# Patient Record
Sex: MALE | Race: WHITE | NOT HISPANIC OR LATINO | Employment: STUDENT | URBAN - METROPOLITAN AREA
[De-identification: names, ages, dates, MRNs, and addresses within clinical notes are randomized per-mention and may not be internally consistent; named-entity substitution may affect disease eponyms.]

---
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
## 2025-04-28 ENCOUNTER — OFFICE VISIT (OUTPATIENT)
Dept: URGENT CARE | Facility: CLINIC | Age: 20
End: 2025-04-28
Payer: COMMERCIAL

## 2025-04-28 VITALS
HEIGHT: 75 IN | TEMPERATURE: 98 F | SYSTOLIC BLOOD PRESSURE: 137 MMHG | HEART RATE: 87 BPM | BODY MASS INDEX: 19.38 KG/M2 | WEIGHT: 155.88 LBS | RESPIRATION RATE: 20 BRPM | OXYGEN SATURATION: 98 % | DIASTOLIC BLOOD PRESSURE: 80 MMHG

## 2025-04-28 DIAGNOSIS — L29.9 ITCHING: ICD-10-CM

## 2025-04-28 DIAGNOSIS — R21 RASH: Primary | ICD-10-CM

## 2025-04-28 PROCEDURE — 99203 OFFICE O/P NEW LOW 30 MIN: CPT | Mod: S$GLB,,, | Performed by: FAMILY MEDICINE

## 2025-04-28 RX ORDER — IVERMECTIN 3 MG/1
3 TABLET ORAL DAILY PRN
Qty: 3 TABLET | Refills: 0 | Status: SHIPPED | OUTPATIENT
Start: 2025-04-28

## 2025-04-28 RX ORDER — PERMETHRIN 50 MG/G
CREAM TOPICAL ONCE
Qty: 60 G | Refills: 1 | Status: SHIPPED | OUTPATIENT
Start: 2025-04-28 | End: 2025-04-28

## 2025-04-28 RX ORDER — HYDROXYZINE HYDROCHLORIDE 25 MG/1
25 TABLET, FILM COATED ORAL 3 TIMES DAILY PRN
Qty: 30 TABLET | Refills: 0 | Status: SHIPPED | OUTPATIENT
Start: 2025-04-28 | End: 2025-05-08

## 2025-04-28 NOTE — PROGRESS NOTES
"Subjective:      Patient ID: Santi Stevenson is a 19 y.o. male.    Vitals:  height is 6' 3" (1.905 m) and weight is 70.7 kg (155 lb 13.8 oz). His oral temperature is 98.1 °F (36.7 °C). His blood pressure is 137/80 and his pulse is 87. His respiration is 20 and oxygen saturation is 98%.     Chief Complaint: Rash    Pt reports about 3-4 mos ago he has been having constant generalized itching. He thinks it started after he was staying at a friends house and sleeping on friends couch. He says he will just itch everywhere all night.  He is concerned for scabies.     Rash  This is a new problem. The current episode started in the past 7 days. The problem has been gradually improving since onset. The affected locations include the abdomen, left arm, right arm, right upper leg, right lower leg, left upper leg and left lower leg. The rash is characterized by itchiness and redness. It is unknown if there was an exposure to a precipitant. Pertinent negatives include no fatigue or fever. Past treatments include moisturizer. The treatment provided no relief.       Constitution: Negative for activity change, appetite change, chills, sweating, fatigue and fever.   Skin:  Positive for rash.   Allergic/Immunologic: Positive for itching.      Objective:     Physical Exam   Constitutional: He is oriented to person, place, and time. He appears well-developed.  Non-toxic appearance. He does not appear ill. No distress.   HENT:   Head: Normocephalic and atraumatic.   Ears:   Right Ear: External ear normal.   Left Ear: External ear normal.   Nose: Nose normal.   Mouth/Throat: Oropharynx is clear and moist.   Eyes: Conjunctivae, EOM and lids are normal. Pupils are equal, round, and reactive to light.   Neck: Trachea normal and phonation normal. Neck supple.   Musculoskeletal: Normal range of motion.         General: Normal range of motion.   Neurological: He is alert and oriented to person, place, and time.   Skin: Skin is warm, " dry, intact, not diaphoretic, rash and papular (fine papular rash, in web spaces, flexor aspect of wrists, abd, back, ble, generalized).   Psychiatric: His speech is normal and behavior is normal. Judgment and thought content normal.   Nursing note and vitals reviewed.      Assessment:     1. Rash    2. Itching        Plan:     Concern for scabies, he would like to try ivermectin as well    Permethrin and ivermectin instructions provided in detail.  Patient verbalized understanding.    Discussed results/diagnosis/plan with patient in clinic. Strict precautions given to patient to monitor for worsening signs and symptoms. Advised to follow up with PCP or specialist.    Explained side effects of medications prescribed with patient and informed him/her to discontinue use if he/she has any side effects and to inform UC or PCP if this occurs. All questions answered. Strict ED verses clinic return precautions stressed and given in depth. Advised if symptoms worsens of fail to improve he/she should go to the Emergency Room. Discharge and follow-up instructions given verbally/printed with the patient who expressed understanding and willingness to comply with my recommendations. Patient voiced understanding and in agreement with current treatment plan. Patient exits the exam room in no acute distress. Conversant and engaged during discharge discussion, verbalized understanding.      Rash  -     permethrin (ELIMITE) 5 % cream; Apply topically once. for 1 dose  Dispense: 60 g; Refill: 1  -     Ambulatory referral/consult to Dermatology  -     ivermectin (STROMECTOL) 3 mg Tab; Take 1 tablet (3 mg total) by mouth daily as needed.  Dispense: 3 tablet; Refill: 0    Itching  -     hydrOXYzine HCL (ATARAX) 25 MG tablet; Take 1 tablet (25 mg total) by mouth 3 (three) times daily as needed.  Dispense: 30 tablet; Refill: 0

## 2025-04-28 NOTE — PATIENT INSTRUCTIONS
Ivermectin: for 3 days: give on days 1, 2, and 8     Classic scabies, treatment: Topical: Cream 5%: Apply to all areas of the body from the neck to soles of feet (30 g for average adult); leave on for 8 to 14 hours before removing by washing (shower or bath). For older adults, also apply on the hairline, neck, scalp, temple, and forehead. One application is generally curative; may repeat if living mites are observed 14 days after first treatment (Ref).     General Discharge Instructions   PLEASE READ YOUR DISCHARGE INSTRUCTIONS ENTIRELY AS IT CONTAINS IMPORTANT INFORMATION.  If you were prescribed a narcotic or controlled medication, do not drive or operate heavy equipment or machinery while taking these medications.  If you were prescribed antibiotics, please take them to completion.  You must understand that you've received an Urgent Care treatment only and that you may be released before all your medical problems are known or treated. You, the patient, will arrange for follow up care as instructed.    OVER THE COUNTER RECOMMENDATIONS/SUGGESTIONS.    Make sure to stay well hydrated.     Follow up with your PCP or specialty clinic as instructed in the next 2-3 days if not improved or as needed. You can call [phone] to schedule an appointment with appropriate provider.      If you condition worsens, we recommend that you receive another evaluation at the emergency room immediately or contact your primary medical clinic's after hours call service to discuss your concerns.      Please return here or go to the Emergency Department for any concerns or worsening condition.   You can also call [phone] to schedule an appointment with the appropriate provider.    Please return here or go to the Emergency Department for any concerns or worsening of condition.    Thank you for choosing [facility]!    Our goal in the Urgent Care is to always provide outstanding medical care. You may receive a survey by  mail or e-mail in the next week regarding your experience today. We would greatly appreciate you completing and returning the survey. Your feedback provides us with a way to recognize our staff who provide very good care, and it helps us learn how to improve when your experience was below our aspiration of excellence.      We appreciate you trusting us with your medical care. We hope you feel better soon. We will be happy to take care of you for all of your future medical needs.    Sincerely,    MARCO Schmitt